# Patient Record
Sex: FEMALE | Race: WHITE | NOT HISPANIC OR LATINO | Employment: OTHER | URBAN - METROPOLITAN AREA
[De-identification: names, ages, dates, MRNs, and addresses within clinical notes are randomized per-mention and may not be internally consistent; named-entity substitution may affect disease eponyms.]

---

## 2017-03-01 ENCOUNTER — HOSPITAL ENCOUNTER (EMERGENCY)
Facility: HOSPITAL | Age: 82
Discharge: HOME | End: 2017-03-01
Attending: EMERGENCY MEDICINE | Admitting: EMERGENCY MEDICINE

## 2017-03-01 VITALS
HEART RATE: 78 BPM | WEIGHT: 145 LBS | RESPIRATION RATE: 16 BRPM | HEIGHT: 68 IN | TEMPERATURE: 97.8 F | DIASTOLIC BLOOD PRESSURE: 66 MMHG | SYSTOLIC BLOOD PRESSURE: 135 MMHG | OXYGEN SATURATION: 95 % | BODY MASS INDEX: 21.98 KG/M2

## 2017-03-01 DIAGNOSIS — R19.7 DIARRHEA, UNSPECIFIED TYPE: Primary | ICD-10-CM

## 2017-03-01 DIAGNOSIS — E87.6 HYPOKALEMIA: ICD-10-CM

## 2017-03-01 LAB
ALBUMIN SERPL-MCNC: 3.3 G/DL (ref 3.5–5.2)
ALBUMIN/GLOB SERPL: 1.4 G/DL
ALP SERPL-CCNC: 69 U/L (ref 40–129)
ALT SERPL W P-5'-P-CCNC: 8 U/L (ref 5–33)
ANION GAP SERPL CALCULATED.3IONS-SCNC: 12.2 MMOL/L
AST SERPL-CCNC: 15 U/L (ref 5–32)
BASOPHILS # BLD AUTO: 0.03 10*3/MM3 (ref 0–0.2)
BASOPHILS NFR BLD AUTO: 0.4 % (ref 0–2)
BILIRUB SERPL-MCNC: 0.4 MG/DL (ref 0.2–1.2)
BUN BLD-MCNC: 20 MG/DL (ref 8–23)
BUN/CREAT SERPL: 24.7 (ref 7–25)
CALCIUM SPEC-SCNC: 8.8 MG/DL (ref 8.8–10.5)
CHLORIDE SERPL-SCNC: 100 MMOL/L (ref 98–107)
CO2 SERPL-SCNC: 28.8 MMOL/L (ref 22–29)
CREAT BLD-MCNC: 0.81 MG/DL (ref 0.57–1)
DEPRECATED RDW RBC AUTO: 43.5 FL (ref 37–54)
EOSINOPHIL # BLD AUTO: 0.06 10*3/MM3 (ref 0.1–0.3)
EOSINOPHIL NFR BLD AUTO: 0.8 % (ref 0–4)
ERYTHROCYTE [DISTWIDTH] IN BLOOD BY AUTOMATED COUNT: 13.5 % (ref 11.5–14.5)
GFR SERPL CREATININE-BSD FRML MDRD: 67 ML/MIN/1.73
GLOBULIN UR ELPH-MCNC: 2.4 GM/DL
GLUCOSE BLD-MCNC: 100 MG/DL (ref 65–99)
HCT VFR BLD AUTO: 38.2 % (ref 37–47)
HGB BLD-MCNC: 12.7 G/DL (ref 12–16)
IMM GRANULOCYTES # BLD: 0.04 10*3/MM3 (ref 0–0.03)
IMM GRANULOCYTES NFR BLD: 0.6 % (ref 0–0.5)
LYMPHOCYTES # BLD AUTO: 0.75 10*3/MM3 (ref 0.6–4.8)
LYMPHOCYTES NFR BLD AUTO: 10.4 % (ref 20–45)
MCH RBC QN AUTO: 29.2 PG (ref 27–31)
MCHC RBC AUTO-ENTMCNC: 33.2 G/DL (ref 31–37)
MCV RBC AUTO: 87.8 FL (ref 81–99)
MONOCYTES # BLD AUTO: 0.83 10*3/MM3 (ref 0–1)
MONOCYTES NFR BLD AUTO: 11.5 % (ref 3–8)
NEUTROPHILS # BLD AUTO: 5.51 10*3/MM3 (ref 1.5–8.3)
NEUTROPHILS NFR BLD AUTO: 76.3 % (ref 45–70)
NRBC BLD MANUAL-RTO: 0 /100 WBC (ref 0–0)
PLATELET # BLD AUTO: 243 10*3/MM3 (ref 140–500)
PMV BLD AUTO: 8.9 FL (ref 7.4–10.4)
POTASSIUM BLD-SCNC: 3 MMOL/L (ref 3.5–5.2)
PROT SERPL-MCNC: 5.7 G/DL (ref 6–8.5)
RBC # BLD AUTO: 4.35 10*6/MM3 (ref 4.2–5.4)
SODIUM BLD-SCNC: 141 MMOL/L (ref 136–145)
WBC NRBC COR # BLD: 7.22 10*3/MM3 (ref 4.8–10.8)

## 2017-03-01 PROCEDURE — 87046 STOOL CULTR AEROBIC BACT EA: CPT | Performed by: EMERGENCY MEDICINE

## 2017-03-01 PROCEDURE — 87045 FECES CULTURE AEROBIC BACT: CPT | Performed by: EMERGENCY MEDICINE

## 2017-03-01 PROCEDURE — 87329 GIARDIA AG IA: CPT

## 2017-03-01 PROCEDURE — 87493 C DIFF AMPLIFIED PROBE: CPT | Performed by: EMERGENCY MEDICINE

## 2017-03-01 PROCEDURE — 99284 EMERGENCY DEPT VISIT MOD MDM: CPT

## 2017-03-01 PROCEDURE — 80053 COMPREHEN METABOLIC PANEL: CPT | Performed by: EMERGENCY MEDICINE

## 2017-03-01 PROCEDURE — 87328 CRYPTOSPORIDIUM AG IA: CPT

## 2017-03-01 PROCEDURE — 85025 COMPLETE CBC W/AUTO DIFF WBC: CPT | Performed by: EMERGENCY MEDICINE

## 2017-03-01 PROCEDURE — 99284 EMERGENCY DEPT VISIT MOD MDM: CPT | Performed by: EMERGENCY MEDICINE

## 2017-03-01 RX ORDER — POTASSIUM CHLORIDE 20MEQ/15ML
40 LIQUID (ML) ORAL ONCE
Status: DISCONTINUED | OUTPATIENT
Start: 2017-03-01 | End: 2017-03-01 | Stop reason: CLARIF

## 2017-03-01 RX ORDER — POTASSIUM CHLORIDE 750 MG/1
10 TABLET, EXTENDED RELEASE ORAL 2 TIMES DAILY
Qty: 10 TABLET | Refills: 0 | Status: SHIPPED | OUTPATIENT
Start: 2017-03-01

## 2017-03-01 RX ORDER — POTASSIUM CHLORIDE 1.5 G/1.77G
40 POWDER, FOR SOLUTION ORAL ONCE
Status: COMPLETED | OUTPATIENT
Start: 2017-03-01 | End: 2017-03-01

## 2017-03-01 RX ADMIN — POTASSIUM CHLORIDE 40 MEQ: 1.5 POWDER, FOR SOLUTION ORAL at 13:32

## 2017-03-01 NOTE — DISCHARGE INSTRUCTIONS
Follow-up with Dr. Radhames Davison in 2 days.  Return if there is bloody diarrhea, diarrhea not controlled with Imodium, fever, chills, worse in any way at all.

## 2017-03-01 NOTE — ED PROVIDER NOTES
Subjective   History of Present Illness  History of Present Illness    Chief complaint: Diarrhea    Location: Care home    Quality/Severity:  Moderate, nonbloody    Timing/Onset/Duration: Chronic for the last 6 weeks, worse Saturday night    Modifying Factors: Nothing seems to make it worse.  Imodium does not seem to control the diarrhea    Associated Symptoms: There is no complaint of headache.  No fever chills or cough.  No sore throat earache or nasal congestion.  No chest pain or shortness of breath.  No abdominal pain.  The patient had nausea and vomiting Sunday that was nonbloody and nonbilious    Narrative: This 84-year-old white female has had loose stools for the last 6 weeks since starting Nudexa.  The family stopped the Nudexa on Sunday.  Tonight the patient had 7 diarrheal stools that were nonbloody.  There were 4 people at her assisted living home that had vomiting and diarrhea and were diagnosed with gastroenteritis.  Sunday the patient had nausea and vomiting that was nonbloody and nonbilious.  The family has given the patient Imodium and the diarrhea continues.  The patient has complaint of chronic back pain it's not changed and she has chronic bacteriuria.  The patient has not been on antibiotics for a year.    PCP:  Radhames Davison      Review of Systems   Constitutional: Negative for chills and fever.   HENT: Negative for congestion, ear pain and sore throat.    Respiratory: Negative for cough, chest tightness, shortness of breath and wheezing.    Cardiovascular: Negative for chest pain, palpitations and leg swelling.   Gastrointestinal: Positive for diarrhea (nonbloody), nausea and vomiting (nonbloody, nonbilious). Negative for abdominal pain, blood in stool and constipation.   Genitourinary: Negative for dysuria, flank pain, frequency and hematuria.   Musculoskeletal: Negative for back pain.   Skin: Negative for pallor.   Neurological: Negative for weakness and headaches.    Psychiatric/Behavioral: Positive for confusion (history of dementia, no worse than usual).        Medication List      ASK your doctor about these medications          FLUoxetine 10 MG capsule   Commonly known as:  PROzac       meloxicam 7.5 MG tablet   Commonly known as:  MOBIC   Take 1 tablet by mouth daily.       Mirabegron ER 25 MG tablet sustained-release 24 hour       oxybutynin 5 MG tablet   Commonly known as:  DITROPAN           No past medical history on file.    Allergies   Allergen Reactions   • Sulfa Antibiotics        No past surgical history on file.    Family History   Problem Relation Age of Onset   • Diabetes Father        Social History     Social History   • Marital status:      Spouse name: N/A   • Number of children: N/A   • Years of education: N/A     Social History Main Topics   • Smoking status: Never Smoker   • Smokeless tobacco: Never Used   • Alcohol use No   • Drug use: Not on file   • Sexual activity: Not on file     Other Topics Concern   • Not on file     Social History Narrative   • No narrative on file           Objective   Physical Exam   Constitutional: She appears well-developed and well-nourished. No distress.   ED Triage Vitals:  Temp: 97.7 °F (36.5 °C) (03/01/17 1041)  Heart Rate: 78 (03/01/17 1041)  Resp: 17 (03/01/17 1041)  BP: 134/74 (03/01/17 1041)  SpO2: 98 % (03/01/17 1041)  Temp src: Oral (03/01/17 1041)  Heart Rate Source: Monitor (03/01/17 1041)  Patient Position: Lying (03/01/17 1041)  BP Location: Right arm (03/01/17 1041)  FiO2 (%): n/a    The patient's vitals were reviewed by me.  Unless otherwise noted they are within normal limits.     HENT:   Head: Normocephalic and atraumatic.   Right Ear: External ear normal.   Left Ear: External ear normal.   Nose: Nose normal.   Mouth/Throat: Oropharynx is clear and moist.   Eyes: Conjunctivae and EOM are normal. Pupils are equal, round, and reactive to light. Right eye exhibits no discharge. Left eye exhibits no  discharge.   Neck: Normal range of motion. Neck supple. No JVD present. No tracheal deviation present. No thyromegaly present.   Cardiovascular: Normal rate, regular rhythm, normal heart sounds and intact distal pulses.  Exam reveals no gallop and no friction rub.    No murmur heard.  Pulmonary/Chest: Effort normal and breath sounds normal. No stridor. No respiratory distress. She has no wheezes. She has no rales. She exhibits no tenderness.   Abdominal: Soft. Bowel sounds are normal. She exhibits no distension and no mass. There is no tenderness. There is no rebound and no guarding. No hernia.   Musculoskeletal: Normal range of motion. She exhibits no edema or deformity.   Lymphadenopathy:     She has no cervical adenopathy.   Neurological: She is alert. No cranial nerve deficit. She exhibits normal muscle tone.   Mild confusion, patient is at her baseline.   Skin: Skin is warm and dry. No rash noted. She is not diaphoretic. No erythema. No pallor.   Psychiatric: Her behavior is normal.   Nursing note and vitals reviewed.      Procedures         ED Course  ED Course   Comment By Time   The laboratory values were reviewed me.  The glucose is 100.  The potassium is 3.0.  Total protein is 5.7.  Albumin 3.3.  Neutrophil percentage is 76%. Roger Huggins MD 03/01 1307                  [facility]  No orders to display     Labs Reviewed   COMPREHENSIVE METABOLIC PANEL - Abnormal; Notable for the following:        Result Value    Glucose 100 (*)     Potassium 3.0 (*)     Total Protein 5.7 (*)     Albumin 3.30 (*)     All other components within normal limits    Narrative:     The MDRD GFR formula is only valid for adults with stable renal function between ages 18 and 70.   CBC WITH AUTO DIFFERENTIAL - Abnormal; Notable for the following:     Neutrophil % 76.3 (*)     Lymphocyte % 10.4 (*)     Monocyte % 11.5 (*)     Immature Grans % 0.6 (*)     Eosinophils, Absolute 0.06 (*)     Immature Grans, Absolute 0.04 (*)     All other  components within normal limits   CLOSTRIDIUM DIFFICILE TOXIN, PCR   GIARDIA / CRYPTOSPORIDIUM ANTIGENS   STOOL CULTURE   URINALYSIS W/ CULTURE IF INDICATED   FECAL LACTOFERRIN   OCCULT BLOOD X 1, STOOL   ROTAVIRUS ANTIGEN, STOOL   CBC AND DIFFERENTIAL    Narrative:     The following orders were created for panel order CBC & Differential.  Procedure                               Abnormality         Status                     ---------                               -----------         ------                     CBC Auto Differential[[id number]]         Abnormal            Final result                 Please view results for these tests on the individual orders.     1:12 PM, 03/01/17:  She was reassessed.  Her vital signs are stable.  She has no complaints.  Abdominal exam: Soft nontender no masses positive bowel sounds.  The patient has not been able to have a stool since her arrival here at the emergency department.  It was discussed with the patient's caregiver that we would discharge her home.  We will give her supplemental potassium.  The caregiver should call Dr. Davison this afternoon for a follow-up appointment in 2 days.  The family has been instructed to bring patient back if there is bloody diarrhea, diarrhea not controlled by the Imodium, fever, chills, pain, worse in any way at all.  The family does not wish to remain in the emergency department until the patient has a bowel movement.  Patient may have likely now gotten over her gastroenteritis which she likely obtained at the assisted living facility.  All the family's questions were answered the patient will be discharged in good condition.    Final diagnoses:   None         ED Medications:  Medications - No data to display    New Medications:     Medication List      ASK your doctor about these medications          FLUoxetine 10 MG capsule   Commonly known as:  PROzac       meloxicam 7.5 MG tablet   Commonly known as:  MOBIC   Take 1 tablet by mouth  daily.       Mirabegron ER 25 MG tablet sustained-release 24 hour       oxybutynin 5 MG tablet   Commonly known as:  DITROPAN           Stopped Medications:     Medication List      ASK your doctor about these medications          FLUoxetine 10 MG capsule   Commonly known as:  PROzac       meloxicam 7.5 MG tablet   Commonly known as:  MOBIC   Take 1 tablet by mouth daily.       Mirabegron ER 25 MG tablet sustained-release 24 hour       oxybutynin 5 MG tablet   Commonly known as:  DITROPAN             Final diagnoses:   Diarrhea, unspecified type   Hypokalemia            Roger Huggins MD  03/01/17 1944

## 2017-03-02 LAB
C DIFF TOX GENS STL QL NAA+PROBE: NEGATIVE
G LAMBLIA AG STL QL IA: NEGATIVE
MUV IGG SER IA-ACNC: NEGATIVE

## 2017-03-05 LAB
BACTERIA SPEC AEROBE CULT: NORMAL
BACTERIA SPEC AEROBE CULT: NORMAL

## 2017-03-30 ENCOUNTER — APPOINTMENT (OUTPATIENT)
Dept: CT IMAGING | Facility: HOSPITAL | Age: 82
End: 2017-03-30

## 2017-03-30 ENCOUNTER — HOSPITAL ENCOUNTER (EMERGENCY)
Facility: HOSPITAL | Age: 82
Discharge: HOME | End: 2017-03-30
Attending: EMERGENCY MEDICINE | Admitting: EMERGENCY MEDICINE

## 2017-03-30 VITALS
DIASTOLIC BLOOD PRESSURE: 90 MMHG | HEIGHT: 62 IN | HEART RATE: 80 BPM | BODY MASS INDEX: 24.58 KG/M2 | TEMPERATURE: 98.3 F | SYSTOLIC BLOOD PRESSURE: 147 MMHG | RESPIRATION RATE: 16 BRPM | WEIGHT: 133.56 LBS | OXYGEN SATURATION: 94 %

## 2017-03-30 DIAGNOSIS — R51.9 HEADACHE, UNSPECIFIED HEADACHE TYPE: Primary | ICD-10-CM

## 2017-03-30 DIAGNOSIS — H66.002 ACUTE SUPPURATIVE OTITIS MEDIA OF LEFT EAR WITHOUT SPONTANEOUS RUPTURE OF TYMPANIC MEMBRANE, RECURRENCE NOT SPECIFIED: ICD-10-CM

## 2017-03-30 PROCEDURE — 99283 EMERGENCY DEPT VISIT LOW MDM: CPT

## 2017-03-30 PROCEDURE — 25010000002 CEFTRIAXONE PER 250 MG: Performed by: EMERGENCY MEDICINE

## 2017-03-30 PROCEDURE — 25010000002 KETOROLAC TROMETHAMINE PER 15 MG: Performed by: EMERGENCY MEDICINE

## 2017-03-30 PROCEDURE — 96372 THER/PROPH/DIAG INJ SC/IM: CPT

## 2017-03-30 PROCEDURE — 99284 EMERGENCY DEPT VISIT MOD MDM: CPT | Performed by: EMERGENCY MEDICINE

## 2017-03-30 PROCEDURE — 70450 CT HEAD/BRAIN W/O DYE: CPT

## 2017-03-30 RX ORDER — CEFTRIAXONE 1 G/1
INJECTION, POWDER, FOR SOLUTION INTRAMUSCULAR; INTRAVENOUS
Status: DISCONTINUED
Start: 2017-03-30 | End: 2017-03-30 | Stop reason: WASHOUT

## 2017-03-30 RX ORDER — ROPINIROLE 0.5 MG/1
0.5 TABLET, FILM COATED ORAL 3 TIMES DAILY
COMMUNITY

## 2017-03-30 RX ORDER — DONEPEZIL HYDROCHLORIDE 10 MG/1
10 TABLET, FILM COATED ORAL NIGHTLY
COMMUNITY

## 2017-03-30 RX ORDER — METRONIDAZOLE 500 MG/1
500 TABLET ORAL 3 TIMES DAILY
COMMUNITY

## 2017-03-30 RX ORDER — DULOXETIN HYDROCHLORIDE 60 MG/1
60 CAPSULE, DELAYED RELEASE ORAL DAILY
COMMUNITY

## 2017-03-30 RX ORDER — AMOXICILLIN 500 MG/1
500 CAPSULE ORAL 3 TIMES DAILY
Qty: 21 CAPSULE | Refills: 0 | Status: SHIPPED | OUTPATIENT
Start: 2017-03-30 | End: 2017-04-06

## 2017-03-30 RX ORDER — TRAMADOL HYDROCHLORIDE 50 MG/1
50 TABLET ORAL EVERY 8 HOURS PRN
Qty: 15 TABLET | Refills: 0 | Status: SHIPPED | OUTPATIENT
Start: 2017-03-30 | End: 2017-04-04

## 2017-03-30 RX ORDER — OXYCODONE HYDROCHLORIDE AND ACETAMINOPHEN 5; 325 MG/1; MG/1
1 TABLET ORAL ONCE
Status: COMPLETED | OUTPATIENT
Start: 2017-03-30 | End: 2017-03-30

## 2017-03-30 RX ORDER — KETOROLAC TROMETHAMINE 30 MG/ML
15 INJECTION, SOLUTION INTRAMUSCULAR; INTRAVENOUS ONCE
Status: COMPLETED | OUTPATIENT
Start: 2017-03-30 | End: 2017-03-30

## 2017-03-30 RX ORDER — LORAZEPAM 1 MG/1
TABLET ORAL EVERY 6 HOURS PRN
COMMUNITY

## 2017-03-30 RX ORDER — BACLOFEN 10 MG/1
10 TABLET ORAL 2 TIMES DAILY
COMMUNITY

## 2017-03-30 RX ORDER — CEFTRIAXONE SODIUM 250 MG/1
INJECTION, POWDER, FOR SOLUTION INTRAMUSCULAR; INTRAVENOUS
Status: DISCONTINUED
Start: 2017-03-30 | End: 2017-03-30 | Stop reason: WASHOUT

## 2017-03-30 RX ADMIN — OXYCODONE HYDROCHLORIDE AND ACETAMINOPHEN 1 TABLET: 5; 325 TABLET ORAL at 16:28

## 2017-03-30 RX ADMIN — LIDOCAINE HYDROCHLORIDE 500 MG: 10 INJECTION, SOLUTION INFILTRATION; PERINEURAL at 18:07

## 2017-03-30 RX ADMIN — KETOROLAC TROMETHAMINE 15 MG: 30 INJECTION, SOLUTION INTRAMUSCULAR at 18:08

## 2017-03-30 RX ADMIN — PHENYLEPHRINE HYDROCHLORIDE 2 SPRAY: 0.5 SPRAY NASAL at 16:28

## 2017-03-30 NOTE — DISCHARGE INSTRUCTIONS
Take antibiotics as directed.  Please return to the emergency room for any worsening pain, fevers, nausea, vomiting, vision changes, weakness, mental status changes or any other concerns.

## 2017-03-30 NOTE — ED NOTES
Pt has a very stoic nature and appears to be uncomfortable. Pt is alert and oriented with GCS 15. Pupils are PERRL. Pt does state that light increases pain slightly. Denies any n/v     Tamiko Sosa RN  03/30/17 2431

## 2017-03-30 NOTE — ED NOTES
Pharmacy advised appropriate reconstitution for rocephin for IM injuection. IM meds adminitered separately.      Tamiko Sosa RN  03/30/17 2874

## 2017-03-30 NOTE — ED PROVIDER NOTES
Subjective   History of Present Illness  History of Present Illness    Chief complaint: Headache    Location: Frontal, forehead    Quality/Severity:  Moderate to severe pain    Timing/Duration: Ongoing for nearly 1 week    Modifying Factors: None    Narrative: Patient presents for evaluation of worsening frontal headache for the past week.  She denies any recent injuries or trauma.  She denies any fevers.  She does report a lot of sinus congestion and runny nose symptoms.  She denies any sore throat or difficulties swallowing.  She denies any cough or difficulties breathing.  She has taken multiple NSAIDs as well as a nighttime dose of hydrocodone for these pains but they do not seem to be benefiting her very much at the assisted living facility.  The patient does have a history of sinus infections in the past which have caused similar headache patterns for her.  These usually improve after she start some antibiotics and decongestant.    Associated Symptoms: None    Review of Systems   Constitutional: Negative for activity change, appetite change and fever.   HENT: Positive for congestion, rhinorrhea and sinus pressure. Negative for drooling, ear pain, facial swelling, sore throat and voice change.    Eyes: Negative for pain and visual disturbance.   Respiratory: Negative for cough and shortness of breath.    Cardiovascular: Negative for chest pain.   Gastrointestinal: Negative for abdominal pain, constipation and vomiting.   Genitourinary: Negative for dysuria.   Musculoskeletal: Negative for back pain and myalgias.   Skin: Negative for color change and rash.   Neurological: Positive for headaches. Negative for syncope.   Psychiatric/Behavioral: Negative for agitation.   All other systems reviewed and are negative.      Past Medical History:   Diagnosis Date   • Anxiety    • Bladder spasm    • Dementia    • Depression    • Restless leg syndrome        Allergies   Allergen Reactions   • Sulfa Antibiotics        Past  Surgical History:   Procedure Laterality Date   • HYSTERECTOMY         Family History   Problem Relation Age of Onset   • Diabetes Father        Social History     Social History   • Marital status:      Spouse name: N/A   • Number of children: N/A   • Years of education: N/A     Social History Main Topics   • Smoking status: Never Smoker   • Smokeless tobacco: Never Used   • Alcohol use No   • Drug use: None   • Sexual activity: Not Asked     Other Topics Concern   • None     Social History Narrative   • None       ED Triage Vitals   Temp Heart Rate Resp BP SpO2   03/30/17 1557 03/30/17 1555 03/30/17 1555 03/30/17 1555 03/30/17 1555   98.3 °F (36.8 °C) 89 16 150/92 94 %      Temp src Heart Rate Source Patient Position BP Location FiO2 (%)   03/30/17 1557 -- -- -- --   Oral             Objective   Physical Exam   Constitutional: She is oriented to person, place, and time. She appears well-developed and well-nourished. She appears distressed (mild).   HENT:   Head: Normocephalic and atraumatic.   Right Ear: External ear normal.   Left Ear: External ear normal.   Mouth/Throat: Oropharynx is clear and moist.   Right TM appears normal  Left TM shows some fluid behind the membrane with some mild erythematous changes   Eyes: Conjunctivae and EOM are normal. Pupils are equal, round, and reactive to light. Right eye exhibits no discharge. Left eye exhibits no discharge.   Photophobia demonstrated   Neck: Normal range of motion. Neck supple. No tracheal deviation present.   Cardiovascular: Normal rate, regular rhythm, normal heart sounds and intact distal pulses.  Exam reveals no gallop and no friction rub.    No murmur heard.  Pulmonary/Chest: Effort normal. No respiratory distress. She has no wheezes. She has no rales. She exhibits no tenderness.   Abdominal: Soft. There is no tenderness.   Musculoskeletal: Normal range of motion. She exhibits no edema or deformity.   Neurological: She is alert and oriented to  person, place, and time. She exhibits normal muscle tone.   Skin: Skin is warm and dry. No rash noted. She is not diaphoretic. No erythema. No pallor.   Psychiatric: She has a normal mood and affect. Her behavior is normal. Judgment and thought content normal.   Nursing note and vitals reviewed.    RADIOLOGY        Study: Head CT    Findings:   Middle ear effusion, enlarging left mastoid effusion, correlate to exclude acute inner ear infection, mastoiditis, stable severe cerebral volume loss, stable moderate small vessel ischemic change, no acute intracranial abnormality    Interpreted Contemporaneously by Dr. Hope, independently viewed by me        Procedures         ED Course  ED Course                  MDM  Number of Diagnoses or Management Options     Amount and/or Complexity of Data Reviewed  Tests in the radiology section of CPT®: ordered and reviewed    Risk of Complications, Morbidity, and/or Mortality  Presenting problems: high  Diagnostic procedures: moderate  Management options: moderate        Final diagnoses:   Headache, unspecified headache type   Acute suppurative otitis media of left ear without spontaneous rupture of tympanic membrane, recurrence not specified            Brock Casillas MD  03/30/17 2043

## 2022-01-01 ENCOUNTER — HOSPITAL ENCOUNTER (INPATIENT)
Facility: HOSPITAL | Age: 87
LOS: 2 days | End: 2022-02-19
Attending: EMERGENCY MEDICINE | Admitting: INTERNAL MEDICINE

## 2022-01-01 ENCOUNTER — HOSPITAL ENCOUNTER (INPATIENT)
Facility: HOSPITAL | Age: 87
End: 2022-01-01
Attending: INTERNAL MEDICINE | Admitting: INTERNAL MEDICINE

## 2022-01-01 ENCOUNTER — APPOINTMENT (OUTPATIENT)
Dept: GENERAL RADIOLOGY | Facility: HOSPITAL | Age: 87
End: 2022-01-01

## 2022-01-01 VITALS — DIASTOLIC BLOOD PRESSURE: 91 MMHG | SYSTOLIC BLOOD PRESSURE: 117 MMHG | TEMPERATURE: 101 F

## 2022-01-01 DIAGNOSIS — I47.21 TORSADES DE POINTES: ICD-10-CM

## 2022-01-01 DIAGNOSIS — E87.0 HYPERNATREMIA: ICD-10-CM

## 2022-01-01 DIAGNOSIS — R73.9 HYPERGLYCEMIA: ICD-10-CM

## 2022-01-01 DIAGNOSIS — R09.89 PULMONARY VASCULAR CONGESTION: ICD-10-CM

## 2022-01-01 DIAGNOSIS — N17.9 AKI (ACUTE KIDNEY INJURY): ICD-10-CM

## 2022-01-01 DIAGNOSIS — I44.2 COMPLETE HEART BLOCK: Primary | ICD-10-CM

## 2022-01-01 LAB
ALBUMIN SERPL-MCNC: 3.4 G/DL (ref 3.5–5.2)
ALBUMIN/GLOB SERPL: 1.3 G/DL
ALP SERPL-CCNC: 104 U/L (ref 39–117)
ALT SERPL W P-5'-P-CCNC: 33 U/L (ref 1–33)
ANION GAP SERPL CALCULATED.3IONS-SCNC: 13 MMOL/L (ref 5–15)
APTT PPP: 27.2 SECONDS (ref 22.7–35.4)
AST SERPL-CCNC: 22 U/L (ref 1–32)
BASOPHILS # BLD AUTO: 0.04 10*3/MM3 (ref 0–0.2)
BASOPHILS NFR BLD AUTO: 0.5 % (ref 0–1.5)
BILIRUB SERPL-MCNC: 0.5 MG/DL (ref 0–1.2)
BUN SERPL-MCNC: 51 MG/DL (ref 8–23)
BUN/CREAT SERPL: 38.1 (ref 7–25)
CALCIUM SPEC-SCNC: 8.7 MG/DL (ref 8.6–10.5)
CHLORIDE SERPL-SCNC: 117 MMOL/L (ref 98–107)
CO2 SERPL-SCNC: 22 MMOL/L (ref 22–29)
CREAT SERPL-MCNC: 1.34 MG/DL (ref 0.57–1)
DEPRECATED RDW RBC AUTO: 46.1 FL (ref 37–54)
EOSINOPHIL # BLD AUTO: 0.02 10*3/MM3 (ref 0–0.4)
EOSINOPHIL NFR BLD AUTO: 0.2 % (ref 0.3–6.2)
ERYTHROCYTE [DISTWIDTH] IN BLOOD BY AUTOMATED COUNT: 13.4 % (ref 12.3–15.4)
GFR SERPL CREATININE-BSD FRML MDRD: 37 ML/MIN/1.73
GLOBULIN UR ELPH-MCNC: 2.6 GM/DL
GLUCOSE SERPL-MCNC: 162 MG/DL (ref 65–99)
HCT VFR BLD AUTO: 40.4 % (ref 34–46.6)
HGB BLD-MCNC: 13.2 G/DL (ref 12–15.9)
IMM GRANULOCYTES # BLD AUTO: 0.08 10*3/MM3 (ref 0–0.05)
IMM GRANULOCYTES NFR BLD AUTO: 0.9 % (ref 0–0.5)
INR PPP: 1.05 (ref 0.9–1.1)
LYMPHOCYTES # BLD AUTO: 0.93 10*3/MM3 (ref 0.7–3.1)
LYMPHOCYTES NFR BLD AUTO: 10.8 % (ref 19.6–45.3)
MAGNESIUM SERPL-MCNC: 2.6 MG/DL (ref 1.6–2.4)
MCH RBC QN AUTO: 30.6 PG (ref 26.6–33)
MCHC RBC AUTO-ENTMCNC: 32.7 G/DL (ref 31.5–35.7)
MCV RBC AUTO: 93.7 FL (ref 79–97)
MONOCYTES # BLD AUTO: 0.78 10*3/MM3 (ref 0.1–0.9)
MONOCYTES NFR BLD AUTO: 9 % (ref 5–12)
NEUTROPHILS NFR BLD AUTO: 6.79 10*3/MM3 (ref 1.7–7)
NEUTROPHILS NFR BLD AUTO: 78.6 % (ref 42.7–76)
NRBC BLD AUTO-RTO: 0 /100 WBC (ref 0–0.2)
NT-PROBNP SERPL-MCNC: ABNORMAL PG/ML (ref 0–1800)
PLATELET # BLD AUTO: 302 10*3/MM3 (ref 140–450)
PMV BLD AUTO: 11.1 FL (ref 6–12)
POTASSIUM SERPL-SCNC: 3.4 MMOL/L (ref 3.5–5.2)
PROT SERPL-MCNC: 6 G/DL (ref 6–8.5)
PROTHROMBIN TIME: 13.6 SECONDS (ref 11.7–14.2)
QT INTERVAL: 685 MS
RBC # BLD AUTO: 4.31 10*6/MM3 (ref 3.77–5.28)
SARS-COV-2 RNA RESP QL NAA+PROBE: NOT DETECTED
SODIUM SERPL-SCNC: 152 MMOL/L (ref 136–145)
TROPONIN T SERPL-MCNC: 0.02 NG/ML (ref 0–0.03)
WBC NRBC COR # BLD: 8.64 10*3/MM3 (ref 3.4–10.8)

## 2022-01-01 PROCEDURE — 85610 PROTHROMBIN TIME: CPT | Performed by: EMERGENCY MEDICINE

## 2022-01-01 PROCEDURE — 93010 ELECTROCARDIOGRAM REPORT: CPT | Performed by: INTERNAL MEDICINE

## 2022-01-01 PROCEDURE — 80053 COMPREHEN METABOLIC PANEL: CPT | Performed by: EMERGENCY MEDICINE

## 2022-01-01 PROCEDURE — 25010000002 HYDROMORPHONE 1 MG/ML SOLUTION: Performed by: INTERNAL MEDICINE

## 2022-01-01 PROCEDURE — 83735 ASSAY OF MAGNESIUM: CPT | Performed by: EMERGENCY MEDICINE

## 2022-01-01 PROCEDURE — 83880 ASSAY OF NATRIURETIC PEPTIDE: CPT | Performed by: EMERGENCY MEDICINE

## 2022-01-01 PROCEDURE — 99284 EMERGENCY DEPT VISIT MOD MDM: CPT

## 2022-01-01 PROCEDURE — 25010000002 MAGNESIUM SULFATE 2 GM/50ML SOLUTION: Performed by: EMERGENCY MEDICINE

## 2022-01-01 PROCEDURE — 25010000002 LORAZEPAM PER 2 MG: Performed by: INTERNAL MEDICINE

## 2022-01-01 PROCEDURE — 85025 COMPLETE CBC W/AUTO DIFF WBC: CPT | Performed by: EMERGENCY MEDICINE

## 2022-01-01 PROCEDURE — 85730 THROMBOPLASTIN TIME PARTIAL: CPT | Performed by: EMERGENCY MEDICINE

## 2022-01-01 PROCEDURE — 0 ATROPINE SULFATE: Performed by: EMERGENCY MEDICINE

## 2022-01-01 PROCEDURE — 99222 1ST HOSP IP/OBS MODERATE 55: CPT | Performed by: INTERNAL MEDICINE

## 2022-01-01 PROCEDURE — 25010000002 MORPHINE PER 10 MG: Performed by: INTERNAL MEDICINE

## 2022-01-01 PROCEDURE — U0003 INFECTIOUS AGENT DETECTION BY NUCLEIC ACID (DNA OR RNA); SEVERE ACUTE RESPIRATORY SYNDROME CORONAVIRUS 2 (SARS-COV-2) (CORONAVIRUS DISEASE [COVID-19]), AMPLIFIED PROBE TECHNIQUE, MAKING USE OF HIGH THROUGHPUT TECHNOLOGIES AS DESCRIBED BY CMS-2020-01-R: HCPCS | Performed by: EMERGENCY MEDICINE

## 2022-01-01 PROCEDURE — 93005 ELECTROCARDIOGRAM TRACING: CPT | Performed by: EMERGENCY MEDICINE

## 2022-01-01 PROCEDURE — 25010000002 FENTANYL CITRATE (PF) 50 MCG/ML SOLUTION: Performed by: EMERGENCY MEDICINE

## 2022-01-01 PROCEDURE — 99231 SBSQ HOSP IP/OBS SF/LOW 25: CPT | Performed by: INTERNAL MEDICINE

## 2022-01-01 PROCEDURE — 84484 ASSAY OF TROPONIN QUANT: CPT | Performed by: EMERGENCY MEDICINE

## 2022-01-01 PROCEDURE — 71045 X-RAY EXAM CHEST 1 VIEW: CPT

## 2022-01-01 PROCEDURE — 99238 HOSP IP/OBS DSCHRG MGMT 30/<: CPT | Performed by: INTERNAL MEDICINE

## 2022-01-01 RX ORDER — LORAZEPAM 2 MG/ML
0.5 INJECTION INTRAMUSCULAR
Status: DISCONTINUED | OUTPATIENT
Start: 2022-01-01 | End: 2022-01-01 | Stop reason: HOSPADM

## 2022-01-01 RX ORDER — MORPHINE SULFATE 20 MG/ML
5 SOLUTION ORAL
Status: DISCONTINUED | OUTPATIENT
Start: 2022-01-01 | End: 2022-01-01 | Stop reason: HOSPADM

## 2022-01-01 RX ORDER — ACETAMINOPHEN 325 MG/1
650 TABLET ORAL EVERY 4 HOURS PRN
Status: DISCONTINUED | OUTPATIENT
Start: 2022-01-01 | End: 2022-01-01 | Stop reason: HOSPADM

## 2022-01-01 RX ORDER — MORPHINE SULFATE 20 MG/ML
10 SOLUTION ORAL
Status: DISCONTINUED | OUTPATIENT
Start: 2022-01-01 | End: 2022-01-01 | Stop reason: HOSPADM

## 2022-01-01 RX ORDER — LORAZEPAM 2 MG/ML
1 INJECTION INTRAMUSCULAR
Status: DISCONTINUED | OUTPATIENT
Start: 2022-01-01 | End: 2022-01-01 | Stop reason: HOSPADM

## 2022-01-01 RX ORDER — ACETAMINOPHEN 650 MG/1
650 SUPPOSITORY RECTAL EVERY 4 HOURS PRN
Status: DISCONTINUED | OUTPATIENT
Start: 2022-01-01 | End: 2022-01-01 | Stop reason: HOSPADM

## 2022-01-01 RX ORDER — FENTANYL CITRATE 50 UG/ML
25 INJECTION, SOLUTION INTRAMUSCULAR; INTRAVENOUS
Status: COMPLETED | OUTPATIENT
Start: 2022-01-01 | End: 2022-01-01

## 2022-01-01 RX ORDER — MORPHINE SULFATE 2 MG/ML
2 INJECTION, SOLUTION INTRAMUSCULAR; INTRAVENOUS
Status: DISCONTINUED | OUTPATIENT
Start: 2022-01-01 | End: 2022-01-01 | Stop reason: HOSPADM

## 2022-01-01 RX ORDER — LORAZEPAM 2 MG/ML
2 CONCENTRATE ORAL
Status: DISCONTINUED | OUTPATIENT
Start: 2022-01-01 | End: 2022-01-01 | Stop reason: HOSPADM

## 2022-01-01 RX ORDER — FENTANYL CITRATE 50 UG/ML
25 INJECTION, SOLUTION INTRAMUSCULAR; INTRAVENOUS ONCE
Status: COMPLETED | OUTPATIENT
Start: 2022-01-01 | End: 2022-01-01

## 2022-01-01 RX ORDER — MORPHINE SULFATE 20 MG/ML
20 SOLUTION ORAL
Status: DISCONTINUED | OUTPATIENT
Start: 2022-01-01 | End: 2022-01-01 | Stop reason: HOSPADM

## 2022-01-01 RX ORDER — LORAZEPAM 2 MG/ML
0.5 CONCENTRATE ORAL
Status: DISCONTINUED | OUTPATIENT
Start: 2022-01-01 | End: 2022-01-01 | Stop reason: HOSPADM

## 2022-01-01 RX ORDER — LORAZEPAM 2 MG/ML
2 INJECTION INTRAMUSCULAR
Status: DISCONTINUED | OUTPATIENT
Start: 2022-01-01 | End: 2022-01-01 | Stop reason: HOSPADM

## 2022-01-01 RX ORDER — MORPHINE SULFATE 2 MG/ML
4 INJECTION, SOLUTION INTRAMUSCULAR; INTRAVENOUS
Status: DISCONTINUED | OUTPATIENT
Start: 2022-01-01 | End: 2022-01-01 | Stop reason: HOSPADM

## 2022-01-01 RX ORDER — HYDROMORPHONE HYDROCHLORIDE 1 MG/ML
0.5 INJECTION, SOLUTION INTRAMUSCULAR; INTRAVENOUS; SUBCUTANEOUS
Status: DISCONTINUED | OUTPATIENT
Start: 2022-01-01 | End: 2022-01-01 | Stop reason: HOSPADM

## 2022-01-01 RX ORDER — MORPHINE SULFATE 10 MG/ML
6 INJECTION INTRAMUSCULAR; INTRAVENOUS; SUBCUTANEOUS
Status: DISCONTINUED | OUTPATIENT
Start: 2022-01-01 | End: 2022-01-01 | Stop reason: HOSPADM

## 2022-01-01 RX ORDER — LORAZEPAM 2 MG/ML
1 CONCENTRATE ORAL
Status: DISCONTINUED | OUTPATIENT
Start: 2022-01-01 | End: 2022-01-01 | Stop reason: HOSPADM

## 2022-01-01 RX ORDER — ACETAMINOPHEN 160 MG/5ML
650 SOLUTION ORAL EVERY 4 HOURS PRN
Status: DISCONTINUED | OUTPATIENT
Start: 2022-01-01 | End: 2022-01-01 | Stop reason: HOSPADM

## 2022-01-01 RX ORDER — MAGNESIUM SULFATE HEPTAHYDRATE 40 MG/ML
2 INJECTION, SOLUTION INTRAVENOUS ONCE
Status: DISCONTINUED | OUTPATIENT
Start: 2022-01-01 | End: 2022-01-01 | Stop reason: SDUPTHER

## 2022-01-01 RX ORDER — MAGNESIUM SULFATE HEPTAHYDRATE 40 MG/ML
2 INJECTION, SOLUTION INTRAVENOUS ONCE
Status: COMPLETED | OUTPATIENT
Start: 2022-01-01 | End: 2022-01-01

## 2022-01-01 RX ADMIN — MORPHINE SULFATE 4 MG: 2 INJECTION, SOLUTION INTRAMUSCULAR; INTRAVENOUS at 17:26

## 2022-01-01 RX ADMIN — ATROPINE SULFATE 0.5 MG: 0.1 INJECTION, SOLUTION INTRAVENOUS at 10:19

## 2022-01-01 RX ADMIN — HYDROMORPHONE HYDROCHLORIDE 1 MG: 1 INJECTION, SOLUTION INTRAMUSCULAR; INTRAVENOUS; SUBCUTANEOUS at 08:08

## 2022-01-01 RX ADMIN — HYDROMORPHONE HYDROCHLORIDE 1 MG: 1 INJECTION, SOLUTION INTRAMUSCULAR; INTRAVENOUS; SUBCUTANEOUS at 12:20

## 2022-01-01 RX ADMIN — MORPHINE SULFATE 4 MG: 2 INJECTION, SOLUTION INTRAMUSCULAR; INTRAVENOUS at 14:01

## 2022-01-01 RX ADMIN — LORAZEPAM 1 MG: 2 INJECTION INTRAMUSCULAR; INTRAVENOUS at 01:04

## 2022-01-01 RX ADMIN — LORAZEPAM 0.5 MG: 2 INJECTION INTRAMUSCULAR; INTRAVENOUS at 16:08

## 2022-01-01 RX ADMIN — MORPHINE SULFATE 4 MG: 2 INJECTION, SOLUTION INTRAMUSCULAR; INTRAVENOUS at 01:04

## 2022-01-01 RX ADMIN — MAGNESIUM SULFATE HEPTAHYDRATE 2 G: 2 INJECTION, SOLUTION INTRAVENOUS at 10:16

## 2022-01-01 RX ADMIN — FENTANYL CITRATE 25 MCG: 50 INJECTION, SOLUTION INTRAMUSCULAR; INTRAVENOUS at 10:29

## 2022-01-01 RX ADMIN — MORPHINE SULFATE 4 MG: 2 INJECTION, SOLUTION INTRAMUSCULAR; INTRAVENOUS at 04:59

## 2022-01-01 RX ADMIN — MORPHINE SULFATE 2 MG: 2 INJECTION, SOLUTION INTRAMUSCULAR; INTRAVENOUS at 02:06

## 2022-01-01 RX ADMIN — FENTANYL CITRATE 25 MCG: 50 INJECTION, SOLUTION INTRAMUSCULAR; INTRAVENOUS at 10:36

## 2022-01-01 RX ADMIN — LORAZEPAM 2 MG: 2 INJECTION INTRAMUSCULAR; INTRAVENOUS at 12:19

## 2022-01-01 RX ADMIN — LORAZEPAM 1 MG: 2 INJECTION INTRAMUSCULAR; INTRAVENOUS at 14:01

## 2022-01-01 RX ADMIN — FENTANYL CITRATE 25 MCG: 50 INJECTION, SOLUTION INTRAMUSCULAR; INTRAVENOUS at 11:41

## 2022-01-01 RX ADMIN — LORAZEPAM 0.5 MG: 2 INJECTION INTRAMUSCULAR; INTRAVENOUS at 02:06

## 2022-01-01 RX ADMIN — ATROPINE SULFATE 0.5 MG: 0.1 INJECTION, SOLUTION INTRAVENOUS at 10:12

## 2022-01-01 RX ADMIN — LORAZEPAM 1 MG: 2 INJECTION INTRAMUSCULAR; INTRAVENOUS at 04:59

## 2022-01-01 RX ADMIN — LORAZEPAM 1 MG: 2 INJECTION INTRAMUSCULAR; INTRAVENOUS at 10:46

## 2022-01-01 RX ADMIN — MORPHINE SULFATE 4 MG: 2 INJECTION, SOLUTION INTRAMUSCULAR; INTRAVENOUS at 21:10

## 2022-01-01 RX ADMIN — LORAZEPAM 1 MG: 2 INJECTION INTRAMUSCULAR; INTRAVENOUS at 04:51

## 2022-01-01 RX ADMIN — MORPHINE SULFATE 4 MG: 2 INJECTION, SOLUTION INTRAMUSCULAR; INTRAVENOUS at 04:51

## 2022-01-01 RX ADMIN — LORAZEPAM 0.5 MG: 2 INJECTION INTRAMUSCULAR; INTRAVENOUS at 17:58

## 2022-01-01 RX ADMIN — MORPHINE SULFATE 2 MG: 2 INJECTION, SOLUTION INTRAMUSCULAR; INTRAVENOUS at 17:58

## 2022-01-01 RX ADMIN — LORAZEPAM 1 MG: 2 INJECTION INTRAMUSCULAR; INTRAVENOUS at 17:26

## 2022-01-01 RX ADMIN — MORPHINE SULFATE 4 MG: 2 INJECTION, SOLUTION INTRAMUSCULAR; INTRAVENOUS at 10:46

## 2022-01-01 RX ADMIN — LORAZEPAM 1 MG: 2 INJECTION INTRAMUSCULAR; INTRAVENOUS at 21:10

## 2022-01-01 RX ADMIN — LORAZEPAM 1 MG: 2 INJECTION INTRAMUSCULAR; INTRAVENOUS at 08:08

## 2022-02-17 PROBLEM — G31.9 CEREBRAL ATROPHY (HCC): Status: ACTIVE | Noted: 2022-01-01

## 2022-02-17 PROBLEM — I44.2 COMPLETE HEART BLOCK (HCC): Status: ACTIVE | Noted: 2022-01-01

## 2022-02-17 PROBLEM — I67.9 CEREBROVASCULAR SMALL VESSEL DISEASE: Status: ACTIVE | Noted: 2022-01-01

## 2022-02-17 PROBLEM — G31.1 SENILE DEGENERATION OF BRAIN (HCC): Status: ACTIVE | Noted: 2022-01-01

## 2022-02-17 PROBLEM — N17.9 AKI (ACUTE KIDNEY INJURY): Status: ACTIVE | Noted: 2022-01-01

## 2022-02-17 PROBLEM — J81.0 ACUTE PULMONARY EDEMA (HCC): Status: ACTIVE | Noted: 2022-01-01

## 2022-02-17 PROBLEM — E87.0 HYPERNATREMIA: Status: ACTIVE | Noted: 2022-01-01

## 2022-02-17 PROBLEM — F02.80 DEMENTIA DUE TO MEDICAL CONDITION WITHOUT BEHAVIORAL DISTURBANCE (HCC): Status: ACTIVE | Noted: 2022-01-01

## 2022-02-17 PROBLEM — I50.811 ACUTE RIGHT-SIDED CONGESTIVE HEART FAILURE: Status: ACTIVE | Noted: 2022-01-01

## 2022-02-17 NOTE — ED NOTES
Interventionalist for Cardiology was called at 10:21 AM; Call back was at 10:23 AM.     Mera Pimentel, PCT  02/17/22 1029

## 2022-02-17 NOTE — ED TRIAGE NOTES
"All triage performed with this RN wearing appropriate PPE.  Pt placed in mask upon arrival to ED.  Patient to Ed from [facility]. Per NH staff patient was \"not feeling ok yesterday\". They had the NH MD come see her today and he found her to be bradycardic and hypotensive.   "

## 2022-02-17 NOTE — ED NOTES
Per family pt has had a few periods of restlessness. IV Ativan given via MD order.      Sandi Howard, RN  02/17/22 3036

## 2022-02-17 NOTE — ED PROVIDER NOTES
EMERGENCY DEPARTMENT ENCOUNTER  I wore full protective equipment throughout this patient encounter including a N95 mask, eye shield, gown and gloves. Hand hygiene was performed before donning protective equipment and after removal when leaving the room.    Room Number:  16/16  Date of encounter:  2/17/2022  PCP: Radhames Davison MD    HPI:  Context: Tamiko Carter is a 89 y.o. female who presents to the ED c/o chief complaint of third-degree AV block.  Patient from [facility] and rehab, history of dementia, patient unable to give history, history by EMS.  Patient was not feeling well yesterday, seen by MD today, found to be bradycardic and hypotensive.  EMS reports that patient had third-degree heart block, would have run of torsades to points.  They report they they attempted to overdrive pacer without success.  No other interventions prior to arrival.    MEDICAL HISTORY REVIEW  Reviewed in [facility]    PAST MEDICAL HISTORY  Active Ambulatory Problems     Diagnosis Date Noted   • Fracture of upper arm 02/24/2016   • Left shoulder pain 04/06/2016     Resolved Ambulatory Problems     Diagnosis Date Noted   • No Resolved Ambulatory Problems     Past Medical History:   Diagnosis Date   • Anxiety    • Bladder spasm    • Dementia (HCC)    • Depression    • Restless leg syndrome        PAST SURGICAL HISTORY  Past Surgical History:   Procedure Laterality Date   • HYSTERECTOMY         FAMILY HISTORY  Family History   Problem Relation Age of Onset   • Diabetes Father        SOCIAL HISTORY  Social History     Socioeconomic History   • Marital status:    Tobacco Use   • Smoking status: Never Smoker   • Smokeless tobacco: Never Used   Substance and Sexual Activity   • Alcohol use: No       ALLERGIES  Sulfa antibiotics    The patient's allergies have been reviewed    REVIEW OF SYSTEMS  Unable to obtain review of systems secondary to altered mental status/dementia    PHYSICAL EXAM  I have reviewed the triage vital  signs and nursing notes.  ED Triage Vitals   Temp Heart Rate Resp BP SpO2   -- 02/17/22 1021 02/17/22 1011 02/17/22 1021 02/17/22 1022    (!) 41 16 125/100 91 %      Temp src Heart Rate Source Patient Position BP Location FiO2 (%)   -- 02/17/22 1021 02/17/22 1021 02/17/22 1021 --    Monitor Lying Left arm        General: Lethargic but arouses to voice  HENT: NCAT, PERRL, Nares patent.  Eyes: no scleral icterus.  Neck: trachea midline, no ROM limitations.  CV: regular rhythm, bradycardic rate.  Respiratory: normal effort, crackles bilateral lower lungs.  Abdomen: soft, nondistended, NTTP, no rebound tenderness, no guarding or rigidity.  Musculoskeletal: no deformity.  Neuro: alert, moves all extremities, follows commands.  Skin: warm, dry.    LAB RESULTS  Recent Results (from the past 24 hour(s))   ECG 12 Lead    Collection Time: 02/17/22 10:15 AM   Result Value Ref Range    QT Interval 685 ms   Comprehensive Metabolic Panel    Collection Time: 02/17/22 10:23 AM    Specimen: Blood   Result Value Ref Range    Glucose 162 (H) 65 - 99 mg/dL    BUN 51 (H) 8 - 23 mg/dL    Creatinine 1.34 (H) 0.57 - 1.00 mg/dL    Sodium 152 (H) 136 - 145 mmol/L    Potassium 3.4 (L) 3.5 - 5.2 mmol/L    Chloride 117 (H) 98 - 107 mmol/L    CO2 22.0 22.0 - 29.0 mmol/L    Calcium 8.7 8.6 - 10.5 mg/dL    Total Protein 6.0 6.0 - 8.5 g/dL    Albumin 3.40 (L) 3.50 - 5.20 g/dL    ALT (SGPT) 33 1 - 33 U/L    AST (SGOT) 22 1 - 32 U/L    Alkaline Phosphatase 104 39 - 117 U/L    Total Bilirubin 0.5 0.0 - 1.2 mg/dL    eGFR Non African Amer 37 (L) >60 mL/min/1.73    Globulin 2.6 gm/dL    A/G Ratio 1.3 g/dL    BUN/Creatinine Ratio 38.1 (H) 7.0 - 25.0    Anion Gap 13.0 5.0 - 15.0 mmol/L   Protime-INR    Collection Time: 02/17/22 10:23 AM    Specimen: Blood   Result Value Ref Range    Protime 13.6 11.7 - 14.2 Seconds    INR 1.05 0.90 - 1.10   aPTT    Collection Time: 02/17/22 10:23 AM    Specimen: Blood   Result Value Ref Range    PTT 27.2 22.7 - 35.4  seconds   Troponin    Collection Time: 02/17/22 10:23 AM    Specimen: Blood   Result Value Ref Range    Troponin T 0.021 0.000 - 0.030 ng/mL   BNP    Collection Time: 02/17/22 10:23 AM    Specimen: Blood   Result Value Ref Range    proBNP 10,224.0 (H) 0.0-1,800.0 pg/mL   Magnesium    Collection Time: 02/17/22 10:23 AM    Specimen: Blood   Result Value Ref Range    Magnesium 2.6 (H) 1.6 - 2.4 mg/dL   CBC Auto Differential    Collection Time: 02/17/22 10:23 AM    Specimen: Blood   Result Value Ref Range    WBC 8.64 3.40 - 10.80 10*3/mm3    RBC 4.31 3.77 - 5.28 10*6/mm3    Hemoglobin 13.2 12.0 - 15.9 g/dL    Hematocrit 40.4 34.0 - 46.6 %    MCV 93.7 79.0 - 97.0 fL    MCH 30.6 26.6 - 33.0 pg    MCHC 32.7 31.5 - 35.7 g/dL    RDW 13.4 12.3 - 15.4 %    RDW-SD 46.1 37.0 - 54.0 fl    MPV 11.1 6.0 - 12.0 fL    Platelets 302 140 - 450 10*3/mm3    Neutrophil % 78.6 (H) 42.7 - 76.0 %    Lymphocyte % 10.8 (L) 19.6 - 45.3 %    Monocyte % 9.0 5.0 - 12.0 %    Eosinophil % 0.2 (L) 0.3 - 6.2 %    Basophil % 0.5 0.0 - 1.5 %    Immature Grans % 0.9 (H) 0.0 - 0.5 %    Neutrophils, Absolute 6.79 1.70 - 7.00 10*3/mm3    Lymphocytes, Absolute 0.93 0.70 - 3.10 10*3/mm3    Monocytes, Absolute 0.78 0.10 - 0.90 10*3/mm3    Eosinophils, Absolute 0.02 0.00 - 0.40 10*3/mm3    Basophils, Absolute 0.04 0.00 - 0.20 10*3/mm3    Immature Grans, Absolute 0.08 (H) 0.00 - 0.05 10*3/mm3    nRBC 0.0 0.0 - 0.2 /100 WBC   COVID-19,[facility] IN-HOUSE CEPHEID/RONNIE NP SWAB IN TRANSPORT MEDIA 8-12 HR TAT - Swab, Nasopharynx    Collection Time: 02/17/22 10:23 AM    Specimen: Nasopharynx; Swab   Result Value Ref Range    COVID19 Not Detected Not Detected - Ref. Range       I ordered the above labs and reviewed the results.    RADIOLOGY  XR Chest 1 View    Result Date: 2/17/2022  PORTABLE CHEST 02/17/2022 AT 1033 HOURS  CLINICAL HISTORY: Arrhythmia.  The lungs are somewhat poorly inflated. The heart is moderately enlarged. There is mild vascular congestion and mild  interstitial thickening throughout both lungs compatible with pulmonary interstitial edema. No focal areas of dense consolidation are identified. There are no pleural effusions.  This report was finalized on 2/17/2022 10:52 AM by Dr. Jasvir Bess M.D.        I ordered the above noted radiological studies. I reviewed the images and results. I agree with the radiologist interpretation.    PROCEDURES  Procedures    MEDICATIONS GIVEN IN ER  Medications   atropine sulfate injection 0.5 mg (0.5 mg Intravenous Given 2/17/22 1012)   atropine sulfate injection 0.5 mg (0.5 mg Intravenous Given 2/17/22 1019)   magnesium sulfate 2g/50 mL (PREMIX) infusion (0 g Intravenous Stopped 2/17/22 1040)   fentaNYL citrate (PF) (SUBLIMAZE) injection 25 mcg (25 mcg Intravenous Given 2/17/22 1029)   fentaNYL citrate (PF) (SUBLIMAZE) injection 25 mcg (25 mcg Intravenous Given 2/17/22 1141)       PROGRESS, DATA ANALYSIS, CONSULTS, AND MEDICAL DECISION MAKING  A complete history and physical exam have been performed.  All available laboratory and imaging results have been reviewed by myself prior to disposition.    MDM  After the initial H&P, I discussed pertinent information from history and physical exam with patient/family.  Discussed differential diagnosis.  Discussed plan for ED evaluation/workup/treatment.  All questions answered.  Patient/family is agreeable with plan.  ED Course as of 02/17/22 1353   Thu Feb 17, 2022   1008 EMS call ahead, patient in third-degree heart block with intermittent arrhythmia, I immediately evaluated patient. [JG]   1026 Phone call with Dr Davey.  Discussed the patient, relevant history, exam, diagnostics, ED findings/progress, and concerns.  She agrees with magnesium and overdrive pacing, states that she will speak with EP and give call back.   [JG]   1030 Patient with third-degree heart block, intermittent torsades.  Torsades is short-lived and self terminates.  Ordering magnesium, giving atropine,  pacer pads placed on patient.  Consulting interventional cardiology. [JG]   1035 Dr. Wallace at bedside. [JG]   1039 Patient currently being paced, good capture, blood pressure improved, mental status improved. [JG]   1045 Dr. Wallace spoke with family, family is currently leaning towards palliative care, wish to speak among themselves and will call back.  Plan to continue pacing externally per Dr. Wallace at this time but if decision is made to place patient is palliative care, will cease external pacing and plan for admission to palliative care.  Currently pending callback by family. [JG]   1200 Family at bedside.  I spoke with patient's daughter and son who are considering palliative care.  I answered all questions.  They are still considering palliative care, which for few more minutes to speak among themselves. [JG]   1213 I spoke again with the patient's son and daughter.  After extensive discussions, they decide that they do not want the patient to receive a place maker, they wish the patient to be admitted to palliative care.  They do wish for the patient to continue to be paced for additional hour to allow time for family members to come then and say their possible goodbyes.  I believe that this is reasonable.  Consulting palliative care for admission. [JG]   1234 Called back to room by family, they no longer wish patient to be paced as it is causing her pain.  Family is aware that patient may go into fetal arrhythmia or her heart may stop at any time.  Family continues to request us to stop external cardiac pacing.  Stopping pacing.  Patient currently pending callback from [place] to care for admission. [JG]   1333 Phone call with Dr. Anson Fierro, palliative care.  Discussed the patient, relevant history, exam, diagnostics, ED findings/progress, and concerns. They agree to admit the patient to palliative care. Care assumed by the admitting physician at this time.     [JG]      ED Course User Index  [JG]  Anthony Arias MD       AS OF 13:53 EST VITALS:    BP - 125/100  HR - (!) 45  TEMP -    O2 SATS - 97%    DIAGNOSIS  Final diagnoses:   Complete heart block (HCC)   Torsades de pointes (HCC)   Pulmonary vascular congestion   LEISA (acute kidney injury) (HCC)   Hypernatremia   Hyperglycemia     Critical care:  Total critical care time of 50 minutes is exclusive of any other billable procedures and includes time spent with direct patient care and observation, retrospective chart review, management of acute condition, and consultation with other physicians.      DISPOSITION  ADMISSION    Discussed treatment plan and reason for admission with pt/family and admitting physician.  Pt/family voiced understanding of the plan for admission for further testing/treatment as needed.          Anthony Arias MD  02/17/22 5286

## 2022-02-17 NOTE — PLAN OF CARE
Goal Outcome Evaluation:  Plan of Care Reviewed With: daughter        Progress: declining  Outcome Summary: Pt in 3rd degree heart block, family opted not to do pacemaker.  Pt unresponsive, medicated with morphine and ativan.

## 2022-02-17 NOTE — CONSULTS
HSB admit 2/17/2022  \[facility]\"" ID [id number]    Primary diagnosis: Third degree AV block I44.2    Patient is needing IV symptom management.    Met with dgtr and granddgtrs at bedside and provided explanation of services. Consents signed. Written material provided.    Thank you for the referral.    Katie Luciano RN  \[facility]\""  [phone]

## 2022-02-18 NOTE — PLAN OF CARE
Problem: Adult Inpatient Plan of Care  Goal: Plan of Care Review  Outcome: Ongoing, Progressing  Flowsheets (Taken 2/18/2022 0424)  Progress: declining  Plan of Care Reviewed With:  • patient  • daughter  Outcome Summary: Maintained comfort measures per palliative care protocol. Patient was medicated with PRN Ativan 0.5mg and Morphine 2mg IV x2 and eventually increased the dose to Ativan 1mg and Morphine 4mg IV to  better control patient's pain and anxiety. Family at bedside. Patient responds to pain. Will continue to monitor vital signs and comfort.   Goal Outcome Evaluation:  Plan of Care Reviewed With: patient, daughter        Progress: declining  Outcome Summary: Maintained comfort measures per palliative care protocol. Patient was medicated with PRN Ativan 0.5mg and Morphine 2mg IV x2. Family at bedside. Patient responds to pain. Will continue to monitor vital signs and comfort.

## 2022-02-18 NOTE — CONSULTS
Patient transferred to the palliative care unit following goals of care and treatment preferences discussion with Dr. Arias and Dr. Fierro. Patient and/or family state goal of care to be comfort and treatment preferences to be geared towards symptom management.  Patient now HSB.  Met with son and dgtr at bedside. Family grieving appropriately at this time, denies any psychosocial needs. The palliative care team will continue to follow for any further needs.

## 2022-02-18 NOTE — PROGRESS NOTES
Discharge Planning Assessment  [facility]     Patient Name: Tamiko Carter  MRN: [id number]  Today's Date: 2/18/2022    Admit Date: 2/17/2022     Discharge Needs Assessment    No documentation.                Discharge Plan     Row Name 02/18/22 0854       Plan    Plan Comments The patient was transferred to [facility] from ER and admitted to a [facility] scattered bed the same day. CCP will continue to follow for any needs that may arise. MEHRAN Costello RN, CCP.              Continued Care and Services - Admitted Since 2/17/2022     Destination Coordination complete.    Service Provider Request Status Selected Services Address Phone Fax Patient Preferred    [facility]   [facility] [address] [facility] [id number] [phone] --              Expected Discharge Date and Time     Expected Discharge Date Expected Discharge Time    Feb 22, 2022          Demographic Summary    No documentation.                Functional Status    No documentation.                Psychosocial    No documentation.                Abuse/Neglect    No documentation.                Legal    No documentation.                Substance Abuse    No documentation.                Patient Forms    No documentation.                   Jessica Costello RN

## 2022-02-18 NOTE — PROGRESS NOTES
[facility]    Tamiko Carter  [id number]  2/18/2022    Admission R/T [facility] Dx:  yes    Reason for [facility] Admission: atrioventricular block    Symptom  Management:  Pain, SOA, restlessness    Nursing/Medication Recommendations: Continue to provide comfort care as ordered.     Psychosocial Issues and Recommendations: None at present    Spiritual Concerns and Recommendations: None at present    [facility] Discharge Plans: GIP appropriate for administration and titration of parenteral medications and continuous skilled nursing assessments and intervention.  If patient were to stabilize she would likely need LTC with [facility] to follow.     Review of Visit:  Vital Signs:  97.8 41 10 UTO BP    PPS:  10%    Medications in 24 hours:    - 2mg morphine IV x 2  - 4mg morphine IV x 2  - 0.5mg ativan IV x 3  - 1mg ativan IV x 2    Assessment:  Collaborated with staff JOE Jones and reviewed Epic chart prior to visit.   Patient in bed, unresponsive to light touch or voice, but does moan with discomfort.  Skin is pale and warm, hot in areas (patient has on several blankets but is not febrile).  Bottoms of toes and feet are red and not blanchable.Respirations are even and unlabored with O2 sat 92% on 2LNC. Abdomen is soft, patient moaned slightly when bladder was softly palpated.  Discussed FC placement with daughter at bedside hwo states she had already had discussion with staff RN who will place FC with next med pass. Patient has no indicators of distress or discomfort when RN is not touching her.     Family: Daughter at bedside. Discussed FC placement and medication regimen with daughter. She has no question or concerns at this time, but was encouraged to call [facility] Customer Support with any needs.     [facility] RN will continue to see daily to assess needs and collaborate with [facility] staff to provide care which achieves optimal  patient comfort during the end of life.        Miranda Joseph RN  [facility] Bed Team  [phone]

## 2022-02-18 NOTE — H&P
Palliative Care/Hospice Admit/Consult Note       Referring Provider: Anthony Arias MD  Reason for Consultation: Hospice Care  Date of Admission:  2/17/2022    Patient Care Team:  Jasvir Bowden MD as PCP - General (Family Medicine)  Anson Fierro MD as Attending Provider (Hospice and Palliative Medicine)    Chief complaint: Complete AV Heart Block    History of present illness:  The patient is a 89 y.o. female who presented to the ED in third-degree AV block.  Patient from [facility] and rehab, the patient has dementia, patient was unable to give history, and history provided by EMS.  Patient was not feeling well 2/16/2022 and was seen by the MD today, found to be bradycardic and hypotensive.  EMS reported that patient had third-degree heart block, would have run of torsades de pointes.  They reported they attempted to overdrive pacer without success.    External pacer placed with results.  Cardiology discussed with the patient's family.  The with to proceed with palliative/hospice care.  External pacer was discontinued.  I was called to admit the patient to [facility].    At the time of my evaluation, the patient's daughter and 2 granddaughters were at bedside.  The patient was lying slightly on her right side with head of bed elevated 10 degrees.  The patient minimally arouse during examination.  The patient was not awake and no ROS obtainable.    Review of Systems  Pertinent items are noted in HPI    Palliative Performance Scale  Palliative Performance Scale Score: 10%  [place] Symptom Assessment System Revised  Pain Score: no pain   ESAS Tiredness Score: Worst possible tiredness  ESAS Nausea Score: No nausea  ESAS Depression Score: No depression  ESAS Anxiety Score: No anxiety  ESAS Drowsiness Score: Worst possible drowsiness  ESAS Lack of Appetite Score: Worst lack of appetite  ESAS Wellbeing Score: unable to assess  ESAS Dyspnea Score: No shortness of breath  ESAS Other Problem Score: unable  to assess  ESAS Source of Information: healthcare professional caregiver, family caregiver  ESAS Intervention: medicated/see MAR  ESAS Intervention Response: tolerated    History  Past Medical History:   Diagnosis Date   • Anxiety    • Bladder spasm    • Dementia (HCC)    • Depression    • Restless leg syndrome    ,   Past Surgical History:   Procedure Laterality Date   • HYSTERECTOMY     ,   Family History   Problem Relation Age of Onset   • Diabetes Father     and   Social History     Socioeconomic History   • Marital status:    Tobacco Use   • Smoking status: Never Smoker   • Smokeless tobacco: Never Used   Substance and Sexual Activity   • Alcohol use: No     E-cigarette/Vaping     E-cigarette/Vaping Substances     E-cigarette/Vaping Devices        Allergy Sulfa antibiotics    Vital Signs   Temp:  [97.1 °F (36.2 °C)-97.8 °F (36.6 °C)] 97.8 °F (36.6 °C)  Heart Rate:  [] 32  Resp:  [16-20] 20  BP: (117-127)/() 117/91  Device (Oxygen Therapy): nasal cannulaFlow (L/min):  [2] 2SpO2:  [88 %-99 %] 99 %    Physical Exam:  General Appearance:   Not awake and appears in no acute distress lying slightly on her left side with head of bed elevated 10 degrees, chronically ill-appearing elderly female   Head:    Normocephalic, without obvious abnormality, atraumatic   Eyes:            Lids and lashes normal, conjunctivae and sclerae normal, no icterus   Ears:    Ears appear intact with no abnormalities noted   Throat:   No oral lesions, oral mucosa somewhat moist   Neck:   No adenopathy, supple, trachea midline, no thyromegaly   Back:     No scoliosis present   Lungs:     Clear to auscultation, respirations diminished and regular and slight increase inspiratory effort and rate    Heart:    Regular rhythm and bradycardia evident   Breast Exam:    Deferred   Abdomen:   Occasional bowel sounds, soft and non-tender, non-distended   Genitalia:    Deferred   Extremities:  No edema, pale and ashen and no cyanosis     Pulses:  Radial pulses palpable and equal and strong bilaterally   Skin:   No bleeding         Neurologic:  Not awake to test      Results Review:   I reviewed the patient's new clinical results.    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  I reviewed the patient's medical records including Care Everywhere.  I reviewed with the RN.  I discussed with the patient's daughter and 2 granddaughters at bedside.  With medications previously administered, the patient appears comfortable.  The patient did receive fentanyl 25 mcg in the ED.  The patient also received 1 dose of 0.5 mg Ativan.  Medications will be continued and adjusted as needed for symptom management for comfort.  No attempts at resuscitation will be made.    I reviewed with the patient's daughter and 2 granddaughters concerning hospice care.  Hospice RN did evaluate with the patient's family and the patient is admitted as a hospice scattered bed patient.      Anson Fierro MD  Hospice and Palliative Medicine  02/17/22  20:09 EST

## 2022-02-19 NOTE — PROGRESS NOTES
Palliative Care/Hospice Follow Up Note       LOS: 2 days   Patient Care Team:  Jasvir Bowden MD as PCP - General (Family Medicine)  Anson Fierro MD as Attending Provider (Hospice and Palliative Medicine)    Chief Complaint:  Complete AV Heart Block    Interval History:     Patient Complaints: None  Patient Denies:  None  History taken from:  Daughter and RN  Review of Systems:  As above.    Palliative Performance Scale  Palliative Performance Scale Score: 10%  [place] Symptom Assessment System Revised  Pain Score: no pain   ESAS Tiredness Score: Worst possible tiredness  ESAS Nausea Score: No nausea  ESAS Depression Score: unable to assess  ESAS Anxiety Score: No anxiety  ESAS Drowsiness Score: Worst possible drowsiness  ESAS Lack of Appetite Score: Worst lack of appetite  ESAS Wellbeing Score: unable to assess  ESAS Dyspnea Score: No shortness of breath  ESAS Other Problem Score: unable to assess  ESAS Source of Information: healthcare professional caregiver  ESAS Intervention: medicated/see MAR  ESAS Intervention Response: tolerated    Vital Signs  Temp:  [100.5 °F (38.1 °C)-101 °F (38.3 °C)] 101 °F (38.3 °C)  Heart Rate:  [77-95] 95  Resp:  [12-16] 12  Device (Oxygen Therapy): nasal cannulaFlow (L/min):  [2] 2SpO2:  [84 %-90 %] 84 %    Physical Exam:  General Appearance:    Not awake and in no acute distress lying slightly on her left side with head of bed elevated 20-30 degrees, chronically ill-appearing elderly female   Throat:   No oral lesions, oral mucosa somewhat moist   Neck:   No adenopathy, supple, trachea midline   Lungs:     Clear to auscultation with slightly coarse sounds, respirations diminished and regular and slight increase inspiratory effort and occasional pauses     Heart:    Regular rhythm and bradycardia versus as determined by palpation, heart rate vital signs and adequate   Abdomen:     Occasional bowel sounds, soft and non-tender, non-distended   Extremities:   No edema, pale  and ashen and cyanosis evident    Pulses:   Radial pulses palpable and equal bilaterally          Results Review:     I reviewed the patient's new clinical results.    Medication Reviewed.    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 I reviewed with the RN. With medications previously administered, the patient appears comfortable. The patient has required 2 doses of 4 mg morphine and 1 dose of 1 mg Dilaudid, 6 doses yesterday, and 3 doses 1 mg Ativan, 6 doses yesterday, thus far today. Medications will be administered as needed for symptom management for comfort. I answered all of the patient's family questions.    Plan for disposition:  ROSA Fierro MD  Hospice and Palliative Medicine  02/19/22  10:27 EST

## 2022-02-19 NOTE — PLAN OF CARE
Goal Outcome Evaluation:  Plan of Care Reviewed With: family      Declining:  Pt responsive to pain, shallow breathing. Pre-medicated prior to turns with 4 mg morphine and 1 mg ativan, with good effect. Resting comfortably throughout the day. Hankins catheter placed for comfort. Family at bedside, attentive to pt and participating in pt's care, onboard with full comfort measures. RN will continue to monitor pt and provide comfort care.

## 2022-02-19 NOTE — PLAN OF CARE
Goal Outcome Evaluation:  Plan of Care Reviewed With: family        Progress: declining  Outcome Summary: VS declining -unable to get BP, O2 84%, medicated for turns @ 9-1-5 with Morphine 4mg, Ativan 1mg, responds to pain, could not even tell me her name, family at bedside

## 2022-02-19 NOTE — PROGRESS NOTES
Palliative Care/Hospice Follow Up Note       LOS: 1 day   Patient Care Team:  Jasvir Bowden MD as PCP - General (Family Medicine)  Anson Fierro MD as Attending Provider (Hospice and Palliative Medicine)    Chief Complaint:  Complete AV Heart Block    Interval History:     Patient Complaints: None  Patient Denies:  None  History taken from:  Daughter and son and RN  Review of Systems:  As above.    Palliative Performance Scale  Palliative Performance Scale Score: 10%  [place] Symptom Assessment System Revised  Pain Score: 4   ESAS Tiredness Score: Worst possible tiredness  ESAS Nausea Score: No nausea  ESAS Depression Score: No depression  ESAS Anxiety Score: 3  ESAS Drowsiness Score: Worst possible drowsiness  ESAS Lack of Appetite Score: Worst lack of appetite  ESAS Wellbeing Score: 5  ESAS Dyspnea Score: No shortness of breath  ESAS Other Problem Score: unable to assess  ESAS Source of Information: healthcare professional caregiver  ESAS Intervention: medicated/see MAR  ESAS Intervention Response: tolerated    Vital Signs  Temp:  [97.8 °F (36.6 °C)-100.5 °F (38.1 °C)] 100.5 °F (38.1 °C)  Heart Rate:  [41-77] 77  Resp:  [10-16] 16  Device (Oxygen Therapy): nasal cannulaFlow (L/min):  [2] 2SpO2:  [90 %-92 %] 90 %    Physical Exam:  General Appearance:    Not awake and in no acute distress lying slightly on her right side with head of bed elevated 20-30 degrees, chronically ill-appearing elderly female   Throat:   No oral lesions, oral mucosa somewhat moist   Neck:   No adenopathy, supple, trachea midline   Lungs:     Clear to auscultation with occasional crackle, respirations diminished and regular and slight increase inspiratory effort and rate    Heart:    Regular rhythm and bradycardia   Abdomen:     Occasional bowel sounds, soft and non-tender, non-distended   Extremities:   No edema, pale and ashen and no cyanosis   Pulses:   Radial pulses palpable and equal bilaterally          Results Review:     I  reviewed the patient's new clinical results.    Medication Reviewed.    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 I reviewed with the RN. With medications previously administered, the patient appears comfortable. The patient has required 1 dose 2 mg and 3 doses of 4 mg morphine and 1 dose of 0.5 mg and 3 doses 1 mg Ativan thus far today. Medications will be administered as needed for symptom management for comfort. I answered all of the patient's family questions.    Plan for disposition:  ROSA Fierro MD  Hospice and Palliative Medicine  02/18/22  19:45 EST

## 2022-02-19 NOTE — PROGRESS NOTES
[facility] Visit Report    Tamiko Carter  [id number]  2/19/2022       [facility]x:  yes     Reason for [facility] Admission: atrioventricular block     Symptom  Management:  Pain, SOA, restlessness     Nursing/Medication Recommendations: Continue to provide comfort care as ordered.      Psychosocial Issues and Recommendations: None at present     Spiritual Concerns and Recommendations: None at present     [facility] Dis[facility] Plans: GIP appropriate for administration and titration of parenteral medications and continuous skilled nursing assessments and intervention.  If patient were to stabilize she would likely need LTC with [facility] to follow.      Review of Visit:  Vital Signs:  101.0 95 12 UTO BP     PPS:  10%     Medications in 24 hours:    - 1mg dilaudid IV x 1  - 4mg morphine IV x 5  - 1mg ativan IV x 6     Assessment:  Collaborated with staff JOE Jones and reviewed Epic chart prior to visit.   Patient in bed, unresponsive to touch and voice.  Skin is pale and warm, mottling forming on tops of bilateral feet and knees.  Bottoms of toes and feet are red and not blanchable. Respirations are even and unlabored with light expiratory moaning, O2 sat 84% on 2LNC. (Moans do not seem to indicate pain). FC is in place with small amount of dark yellow urine in BSD bag. Patient has no indicators of distress or discomfort.      Family: Daughter at bedside. She has no question or concerns at this time, but was encouraged to call [facility] Customer Support with any needs.   [facility] RN will continue to see daily to assess needs and collaborate with [facility] staff to provide care which achieves optimal patient comfort during the end of life.        Miranda Joseph RN  [facility]  Scattered Bed Team  [phone]

## 2022-02-19 NOTE — DISCHARGE SUMMARY
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 female who presented to the ED in third-degree AV block.  Patient from [facility] ([place], [place] and rehab, the patient has dementia, patient was unable to give history, and history provided by EMS.  Patient was not feeling well 2022 and was seen by the MD today, found to be bradycardic and hypotensive.  EMS reported that patient had third-degree heart block, would have run of torsades de pointes.  They reported they attempted to overdrive pacer without success.    External pacer placed with results.  Cardiology discussed with the patient's family.  The with to proceed with palliative/hospice care.  External pacer was discontinued.  I was called to admit the patient to [facility].  The patient was admitted as a hospice scattered bed patient.  Medications provided for symptom management for comfort.  I discussed with the patient's daughter daily.  Please see my note from earlier today, 2022 at 0835 hrs.  Subsequently, I was called that the patient's respirations ceased and no pulse palpable. No heart sounds audible. I pronounced the patient at 1230 hours.    Time: I spent 25 minutes on this discharge activity which included: face-to-face encounter with the patient, reviewing the data in the system, coordination of the care with the nursing staff as well  as documentation and entering orders.       Anson Fierro MD  Hospice and Palliative Medicine  02/19/22  14:31 EST

## 2022-02-21 NOTE — PROGRESS NOTES
Case Management Discharge Note      Final Note: The patient  on 22 @ 12:30. BEdward Costello RN, CCP.         Selected Continued Care - Discharged on 2022 Admission date: 2022 - Discharge disposition:     Destination Coordination complete.    Service Provider Selected Services Address Phone Fax Patient Preferred    [facility] [address], [place] [address] [phone] --          Durable Medical Equipment    No services have been selected for the patient.              Dialysis/Infusion    No services have been selected for the patient.              Home Medical Care    No services have been selected for the patient.              Therapy    No services have been selected for the patient.              Community Resources    No services have been selected for the patient.              Community & DME    No services have been selected for the patient.                       Final Discharge Disposition Code: 41 -  in medical facility